# Patient Record
Sex: FEMALE | Race: OTHER | HISPANIC OR LATINO | URBAN - METROPOLITAN AREA
[De-identification: names, ages, dates, MRNs, and addresses within clinical notes are randomized per-mention and may not be internally consistent; named-entity substitution may affect disease eponyms.]

---

## 2017-11-01 ENCOUNTER — EMERGENCY (EMERGENCY)
Facility: HOSPITAL | Age: 22
LOS: 1 days | Discharge: HOME | End: 2017-11-01
Admitting: EMERGENCY MEDICINE
Payer: COMMERCIAL

## 2017-11-01 VITALS
SYSTOLIC BLOOD PRESSURE: 119 MMHG | DIASTOLIC BLOOD PRESSURE: 75 MMHG | TEMPERATURE: 98 F | HEART RATE: 84 BPM | OXYGEN SATURATION: 96 % | RESPIRATION RATE: 18 BRPM

## 2017-11-01 VITALS
TEMPERATURE: 98 F | RESPIRATION RATE: 18 BRPM | OXYGEN SATURATION: 97 % | DIASTOLIC BLOOD PRESSURE: 86 MMHG | HEART RATE: 101 BPM | SYSTOLIC BLOOD PRESSURE: 130 MMHG

## 2017-11-01 LAB
ALBUMIN SERPL ELPH-MCNC: 4.5 G/DL — SIGNIFICANT CHANGE UP (ref 3.3–5)
ALP SERPL-CCNC: 78 U/L — SIGNIFICANT CHANGE UP (ref 40–120)
ALT FLD-CCNC: 72 U/L — HIGH (ref 4–33)
APPEARANCE UR: SIGNIFICANT CHANGE UP
APTT BLD: 33.6 SEC — SIGNIFICANT CHANGE UP (ref 27.5–37.4)
AST SERPL-CCNC: 42 U/L — HIGH (ref 4–32)
BASOPHILS # BLD AUTO: 0.05 K/UL — SIGNIFICANT CHANGE UP (ref 0–0.2)
BASOPHILS NFR BLD AUTO: 0.5 % — SIGNIFICANT CHANGE UP (ref 0–2)
BILIRUB SERPL-MCNC: 0.3 MG/DL — SIGNIFICANT CHANGE UP (ref 0.2–1.2)
BILIRUB UR-MCNC: NEGATIVE — SIGNIFICANT CHANGE UP
BLOOD UR QL VISUAL: NEGATIVE — SIGNIFICANT CHANGE UP
BUN SERPL-MCNC: 12 MG/DL — SIGNIFICANT CHANGE UP (ref 7–23)
CALCIUM SERPL-MCNC: 9.2 MG/DL — SIGNIFICANT CHANGE UP (ref 8.4–10.5)
CHLORIDE SERPL-SCNC: 103 MMOL/L — SIGNIFICANT CHANGE UP (ref 98–107)
CO2 SERPL-SCNC: 23 MMOL/L — SIGNIFICANT CHANGE UP (ref 22–31)
COLOR SPEC: YELLOW — SIGNIFICANT CHANGE UP
CREAT SERPL-MCNC: 0.8 MG/DL — SIGNIFICANT CHANGE UP (ref 0.5–1.3)
EOSINOPHIL # BLD AUTO: 0.44 K/UL — SIGNIFICANT CHANGE UP (ref 0–0.5)
EOSINOPHIL NFR BLD AUTO: 4.5 % — SIGNIFICANT CHANGE UP (ref 0–6)
GLUCOSE SERPL-MCNC: 82 MG/DL — SIGNIFICANT CHANGE UP (ref 70–99)
GLUCOSE UR-MCNC: NEGATIVE — SIGNIFICANT CHANGE UP
HCG SERPL-ACNC: 2483 MIU/ML — SIGNIFICANT CHANGE UP
HCT VFR BLD CALC: 41.8 % — SIGNIFICANT CHANGE UP (ref 34.5–45)
HGB BLD-MCNC: 13.8 G/DL — SIGNIFICANT CHANGE UP (ref 11.5–15.5)
IMM GRANULOCYTES # BLD AUTO: 0.04 # — SIGNIFICANT CHANGE UP
IMM GRANULOCYTES NFR BLD AUTO: 0.4 % — SIGNIFICANT CHANGE UP (ref 0–1.5)
INR BLD: 1.03 — SIGNIFICANT CHANGE UP (ref 0.88–1.17)
KETONES UR-MCNC: NEGATIVE — SIGNIFICANT CHANGE UP
LEUKOCYTE ESTERASE UR-ACNC: HIGH
LYMPHOCYTES # BLD AUTO: 3.08 K/UL — SIGNIFICANT CHANGE UP (ref 1–3.3)
LYMPHOCYTES # BLD AUTO: 31.4 % — SIGNIFICANT CHANGE UP (ref 13–44)
MCHC RBC-ENTMCNC: 29.1 PG — SIGNIFICANT CHANGE UP (ref 27–34)
MCHC RBC-ENTMCNC: 33 % — SIGNIFICANT CHANGE UP (ref 32–36)
MCV RBC AUTO: 88 FL — SIGNIFICANT CHANGE UP (ref 80–100)
MONOCYTES # BLD AUTO: 0.52 K/UL — SIGNIFICANT CHANGE UP (ref 0–0.9)
MONOCYTES NFR BLD AUTO: 5.3 % — SIGNIFICANT CHANGE UP (ref 2–14)
MUCOUS THREADS # UR AUTO: SIGNIFICANT CHANGE UP
NEUTROPHILS # BLD AUTO: 5.68 K/UL — SIGNIFICANT CHANGE UP (ref 1.8–7.4)
NEUTROPHILS NFR BLD AUTO: 57.9 % — SIGNIFICANT CHANGE UP (ref 43–77)
NITRITE UR-MCNC: NEGATIVE — SIGNIFICANT CHANGE UP
NRBC # FLD: 0 — SIGNIFICANT CHANGE UP
PH UR: 6.5 — SIGNIFICANT CHANGE UP (ref 4.6–8)
PLATELET # BLD AUTO: 386 K/UL — SIGNIFICANT CHANGE UP (ref 150–400)
PMV BLD: 10.5 FL — SIGNIFICANT CHANGE UP (ref 7–13)
POTASSIUM SERPL-MCNC: 3.9 MMOL/L — SIGNIFICANT CHANGE UP (ref 3.5–5.3)
POTASSIUM SERPL-SCNC: 3.9 MMOL/L — SIGNIFICANT CHANGE UP (ref 3.5–5.3)
PROT SERPL-MCNC: 7.4 G/DL — SIGNIFICANT CHANGE UP (ref 6–8.3)
PROT UR-MCNC: 20 — SIGNIFICANT CHANGE UP
PROTHROM AB SERPL-ACNC: 11.6 SEC — SIGNIFICANT CHANGE UP (ref 9.8–13.1)
RBC # BLD: 4.75 M/UL — SIGNIFICANT CHANGE UP (ref 3.8–5.2)
RBC # FLD: 13.7 % — SIGNIFICANT CHANGE UP (ref 10.3–14.5)
RBC CASTS # UR COMP ASSIST: SIGNIFICANT CHANGE UP (ref 0–?)
SODIUM SERPL-SCNC: 142 MMOL/L — SIGNIFICANT CHANGE UP (ref 135–145)
SP GR SPEC: 1.02 — SIGNIFICANT CHANGE UP (ref 1–1.03)
SQUAMOUS # UR AUTO: SIGNIFICANT CHANGE UP
UROBILINOGEN FLD QL: 2 E.U. — SIGNIFICANT CHANGE UP (ref 0.1–0.2)
WBC # BLD: 9.81 K/UL — SIGNIFICANT CHANGE UP (ref 3.8–10.5)
WBC # FLD AUTO: 9.81 K/UL — SIGNIFICANT CHANGE UP (ref 3.8–10.5)
WBC UR QL: HIGH (ref 0–?)

## 2017-11-01 PROCEDURE — 76830 TRANSVAGINAL US NON-OB: CPT | Mod: 26

## 2017-11-01 PROCEDURE — 99284 EMERGENCY DEPT VISIT MOD MDM: CPT

## 2017-11-01 PROCEDURE — 76817 TRANSVAGINAL US OBSTETRIC: CPT | Mod: 26

## 2017-11-01 NOTE — ED PROVIDER NOTE - PROGRESS NOTE DETAILS
DIGNA BOO: pt doing well without complaints. denies vaginal bleeding or pain. spoke with gyn, does not need to see/examine patient given lack of symptoms and private gyn follow up. pt agrees with plan to follow up within the week and return precautions discussed. DIGNA BOO: pt doing well without complaints. denies vaginal bleeding or pain. spoke with gyn, does not need to see/examine patient given lack of symptoms ( no bleeding, no discharge) and private gyn follow up. pt agrees with plan to follow up within the week and return precautions discussed.

## 2017-11-01 NOTE — ED PROVIDER NOTE - OBJECTIVE STATEMENT
23 y/o F pt, , with no sig PMHx, arrives to the ED for a pregnancy check s/p believing she got pregnant 2 weeks ago s/p positive results from a home pregnancy test that she took when she began to feel nausea and breast TTP. Pt reports that her LNMP was "somewhere in the beginning of August, but has been having irregular periods and is on birth control." Pt then saw her GYN for a repeat pregnancy test; positive results again. Pt then had a confirmation US done today, in which no fetal activity was seen; "they didn't tell me." Denies vaginal bleeding, vaginal discharge, cramping, fever, chills, dysuria, hematuria, abd pain or any other complaints. NKDA. 23 y/o F pt, , with no sig PMHx, arrives to the ED for a pregnancy check s/p believing she got pregnant 2 weeks ago s/p positive results from a home pregnancy test that she took when she began to feel nausea and breast TTP. Pt reports that her LNMP was "somewhere in the beginning of August, but has been having irregular periods and is on birth control." Pt then saw her GYN for a repeat pregnancy test; positive results again. Pt then had a confirmation US done today, in which no fetal activity was seen; "they didn't tell me what was wrong with the pregnancy." Denies vaginal bleeding, vaginal discharge, cramping, fever, chills, dysuria, hematuria, abd pain or any other complaints. NKDA. 23 y/o F pt, , with no PMHx, arrives to the ED for a pregnancy check for a positive home pregnancy test that she took 2 weeks ago when she began to feel nausea and breast tenderness. Pt reports that her LNMP was "somewhere in the beginning of August, but has been having irregular periods and is on birth control." Pt then saw her GYN for a repeat pregnancy test; positive results again. Pt then had a confirmation US done today, in which no fetal activity was seen; "they didn't tell me what was wrong with the pregnancy." Denies vaginal bleeding, vaginal discharge, cramping, fever, chills, dysuria, hematuria, abd pain or any other complaints. NKDA.

## 2017-11-01 NOTE — ED ADULT NURSE NOTE - OBJECTIVE STATEMENT
Pt. received in intake room 4, A&O x3 and ambulatory at baseline. Pt. states she had a positive ucg at home 3 weeks ago, saw her OBGYN, had a sonogram, and is unsure of how many weeks pregnant. Pt. states this is her first pregnancy and "they said they didn't see anything." Pt. denies vaginal bleeding, abdominal pain, vaginal discharge, SOB, chest pain, dizziness, weakness. Respirations are even and unlabored on room air. 20 gauge IV inserted in right ac. Labs drawn and sent. VS as noted. Family at bedside. Pt. is awaiting lab results and ultrasound at present. Will continue to monitor.

## 2017-11-01 NOTE — ED PROVIDER NOTE - CHPI ED SYMPTOMS NEG
vaginal bleeding, cramping, hematuria/no abdominal pain/no fever/no chills/no vaginal discharge/no dysuria no dysuria/no chills/no abdominal pain/no fever/denies vaginal bleeding, cramping, hematuria/no vaginal discharge

## 2017-11-01 NOTE — ED PROVIDER NOTE - CARE PLAN
Principal Discharge DX:	Pregnancy-related symptom  Instructions for follow-up, activity and diet:	FOLLOW UP WITH YOUR OBGYN THIS WEEK. RETURN FOR WORSENING SYMPTOMS. Rest, drink plenty of fluids.  Advance activity as tolerated.  Continue all previously prescribed medications as directed. You can use motrin 600mg every 6-8 hours for pain or fever, and/or Tylenol 650 mg every 4 hours for pain/fever. Follow up with your primary care physician in 48-72 hours- bring copies of your results.  Return to the emergency department for chest pain, shortness of breath, dizziness, or worsening, concerning, or persistent symptoms.

## 2017-11-01 NOTE — ED ADULT TRIAGE NOTE - CHIEF COMPLAINT QUOTE
Pt states she had a positive UCG at home, went to have a sonogram and says it was inconclusive, unsure of how far along she is, was on BC pill at the time. Denies abd pain or vaginal bleeding.

## 2017-11-01 NOTE — ED PROVIDER NOTE - MEDICAL DECISION MAKING DETAILS
21 y/o F pt here for pregnancy check. Positive UCG but then "no fetal activity" seen on US done today. Rule out ectopic pregnancy. Will obtain labs, including HCG, transvaginal US and correlate with beta. 23 y/o F pt here for pregnancy check. Positive UCG at home and at OBGYN, but then "no fetal activity" seen on US done outpatient today. No copy of result and patient unsure if IUP. Will obtain labs, including HCG, transvaginal US and correlate with beta.

## 2017-11-03 LAB
BACTERIA UR CULT: SIGNIFICANT CHANGE UP
SPECIMEN SOURCE: SIGNIFICANT CHANGE UP